# Patient Record
Sex: FEMALE | Race: WHITE | URBAN - METROPOLITAN AREA
[De-identification: names, ages, dates, MRNs, and addresses within clinical notes are randomized per-mention and may not be internally consistent; named-entity substitution may affect disease eponyms.]

---

## 2017-02-13 ENCOUNTER — HISTORICAL (OUTPATIENT)
Dept: LAB | Facility: HOSPITAL | Age: 82
End: 2017-02-13

## 2017-06-14 ENCOUNTER — HISTORICAL (OUTPATIENT)
Dept: LAB | Facility: HOSPITAL | Age: 82
End: 2017-06-14

## 2017-06-14 LAB
ALBUMIN SERPL-MCNC: 4.2 GM/DL (ref 3.4–5)
ALBUMIN/GLOB SERPL: 1.4 RATIO (ref 1.1–2)
ALP SERPL-CCNC: 67 UNIT/L (ref 38–126)
ALT SERPL-CCNC: 27 UNIT/L (ref 12–78)
AST SERPL-CCNC: 14 UNIT/L (ref 15–37)
BILIRUB SERPL-MCNC: 0.5 MG/DL (ref 0.2–1)
BILIRUBIN DIRECT+TOT PNL SERPL-MCNC: 0.1 MG/DL (ref 0–0.5)
BILIRUBIN DIRECT+TOT PNL SERPL-MCNC: 0.4 MG/DL (ref 0–0.8)
BUN SERPL-MCNC: 28 MG/DL (ref 7–18)
CALCIUM SERPL-MCNC: 10.2 MG/DL (ref 8.5–10.1)
CHLORIDE SERPL-SCNC: 101 MMOL/L (ref 98–107)
CO2 SERPL-SCNC: 26 MMOL/L (ref 21–32)
CREAT SERPL-MCNC: 1.19 MG/DL (ref 0.55–1.02)
EST. AVERAGE GLUCOSE BLD GHB EST-MCNC: 212 MG/DL
GLOBULIN SER-MCNC: 2.9 GM/DL (ref 2.4–3.5)
GLUCOSE SERPL-MCNC: 206 MG/DL (ref 74–106)
HBA1C MFR BLD: 9 % (ref 4.2–6.3)
POTASSIUM SERPL-SCNC: 4.3 MMOL/L (ref 3.5–5.1)
PROT SERPL-MCNC: 7.1 GM/DL (ref 6.4–8.2)
SODIUM SERPL-SCNC: 140 MMOL/L (ref 136–145)

## 2017-09-18 ENCOUNTER — HISTORICAL (OUTPATIENT)
Dept: LAB | Facility: HOSPITAL | Age: 82
End: 2017-09-18

## 2017-09-18 LAB
ABS NEUT (OLG): 6.3 X10(3)/MCL (ref 2.1–9.2)
ALBUMIN SERPL-MCNC: 4.3 GM/DL (ref 3.4–5)
ALBUMIN/GLOB SERPL: 1.2 {RATIO}
ALP SERPL-CCNC: 73 UNIT/L (ref 38–126)
ALT SERPL-CCNC: 23 UNIT/L (ref 12–78)
AST SERPL-CCNC: 31 UNIT/L (ref 15–37)
BASOPHILS # BLD AUTO: 0.1 X10(3)/MCL (ref 0–0.2)
BASOPHILS NFR BLD AUTO: 1 %
BILIRUB SERPL-MCNC: 0.7 MG/DL (ref 0.2–1)
BILIRUBIN DIRECT+TOT PNL SERPL-MCNC: 0.1 MG/DL (ref 0–0.5)
BILIRUBIN DIRECT+TOT PNL SERPL-MCNC: 0.6 MG/DL (ref 0–0.8)
BUN SERPL-MCNC: 22 MG/DL (ref 7–18)
CALCIUM SERPL-MCNC: 10.1 MG/DL (ref 8.5–10.1)
CHLORIDE SERPL-SCNC: 101 MMOL/L (ref 98–107)
CHOLEST SERPL-MCNC: 161 MG/DL (ref 0–200)
CHOLEST/HDLC SERPL: 3.3 {RATIO} (ref 0–4)
CO2 SERPL-SCNC: 27 MMOL/L (ref 21–32)
CREAT SERPL-MCNC: 1.08 MG/DL (ref 0.55–1.02)
EOSINOPHIL # BLD AUTO: 0.2 X10(3)/MCL (ref 0–0.9)
EOSINOPHIL NFR BLD AUTO: 2 %
ERYTHROCYTE [DISTWIDTH] IN BLOOD BY AUTOMATED COUNT: 13.2 % (ref 11.5–17)
EST. AVERAGE GLUCOSE BLD GHB EST-MCNC: 146 MG/DL
GLOBULIN SER-MCNC: 3.6 GM/DL (ref 2.4–3.5)
GLUCOSE SERPL-MCNC: 113 MG/DL (ref 74–106)
HBA1C MFR BLD: 6.7 % (ref 4.2–6.3)
HCT VFR BLD AUTO: 42.3 % (ref 37–47)
HDLC SERPL-MCNC: 49 MG/DL (ref 35–60)
HGB BLD-MCNC: 14.4 GM/DL (ref 12–16)
LDLC SERPL CALC-MCNC: 81 MG/DL (ref 0–129)
LYMPHOCYTES # BLD AUTO: 1.9 X10(3)/MCL (ref 0.6–4.6)
LYMPHOCYTES NFR BLD AUTO: 21 %
MCH RBC QN AUTO: 31.5 PG (ref 27–31)
MCHC RBC AUTO-ENTMCNC: 34 GM/DL (ref 33–36)
MCV RBC AUTO: 92.6 FL (ref 80–94)
MONOCYTES # BLD AUTO: 0.6 X10(3)/MCL (ref 0.1–1.3)
MONOCYTES NFR BLD AUTO: 7 %
NEUTROPHILS # BLD AUTO: 6.3 X10(3)/MCL (ref 1.4–7.9)
NEUTROPHILS NFR BLD AUTO: 68 %
PLATELET # BLD AUTO: 289 X10(3)/MCL (ref 130–400)
PMV BLD AUTO: 11 FL (ref 9.4–12.4)
POTASSIUM SERPL-SCNC: 4.4 MMOL/L (ref 3.5–5.1)
PROT SERPL-MCNC: 7.9 GM/DL (ref 6.4–8.2)
RBC # BLD AUTO: 4.57 X10(6)/MCL (ref 4.2–5.4)
SODIUM SERPL-SCNC: 138 MMOL/L (ref 136–145)
TRIGL SERPL-MCNC: 156 MG/DL (ref 30–150)
VLDLC SERPL CALC-MCNC: 31 MG/DL
WBC # SPEC AUTO: 9.2 X10(3)/MCL (ref 4.5–11.5)

## 2017-12-12 ENCOUNTER — HISTORICAL (OUTPATIENT)
Dept: RADIOLOGY | Facility: HOSPITAL | Age: 82
End: 2017-12-12

## 2017-12-12 ENCOUNTER — HISTORICAL (OUTPATIENT)
Dept: LAB | Facility: HOSPITAL | Age: 82
End: 2017-12-12

## 2017-12-12 LAB
ALBUMIN SERPL-MCNC: 4.2 GM/DL (ref 3.4–5)
ALBUMIN/GLOB SERPL: 1.2 RATIO (ref 1.1–2)
ALP SERPL-CCNC: 77 UNIT/L (ref 38–126)
ALT SERPL-CCNC: 29 UNIT/L (ref 12–78)
AST SERPL-CCNC: 15 UNIT/L (ref 15–37)
BILIRUB SERPL-MCNC: 0.6 MG/DL (ref 0.2–1)
BILIRUBIN DIRECT+TOT PNL SERPL-MCNC: 0.1 MG/DL (ref 0–0.5)
BILIRUBIN DIRECT+TOT PNL SERPL-MCNC: 0.5 MG/DL (ref 0–0.8)
BUN SERPL-MCNC: 19 MG/DL (ref 7–18)
CALCIUM SERPL-MCNC: 9.3 MG/DL (ref 8.5–10.1)
CHLORIDE SERPL-SCNC: 100 MMOL/L (ref 98–107)
CO2 SERPL-SCNC: 29 MMOL/L (ref 21–32)
CREAT SERPL-MCNC: 1.08 MG/DL (ref 0.55–1.02)
CREAT UR-MCNC: 40.5 MG/DL
EST. AVERAGE GLUCOSE BLD GHB EST-MCNC: 143 MG/DL
GLOBULIN SER-MCNC: 3.4 GM/DL (ref 2.4–3.5)
GLUCOSE SERPL-MCNC: 110 MG/DL (ref 74–106)
HBA1C MFR BLD: 6.6 % (ref 4.2–6.3)
MICROALBUMIN UR-MCNC: 3.4 MG/DL
MICROALBUMIN/CREAT RATIO PNL UR: 84.7 MG/GM CR (ref 0–30)
POTASSIUM SERPL-SCNC: 3.8 MMOL/L (ref 3.5–5.1)
PROT SERPL-MCNC: 7.6 GM/DL (ref 6.4–8.2)
SODIUM SERPL-SCNC: 136 MMOL/L (ref 136–145)

## 2018-03-13 ENCOUNTER — HISTORICAL (OUTPATIENT)
Dept: LAB | Facility: HOSPITAL | Age: 83
End: 2018-03-13

## 2018-03-13 LAB
ABS NEUT (OLG): 6.69 X10(3)/MCL (ref 2.1–9.2)
ALBUMIN SERPL-MCNC: 4.2 GM/DL (ref 3.4–5)
ALBUMIN/GLOB SERPL: 1.4 {RATIO}
ALP SERPL-CCNC: 60 UNIT/L (ref 38–126)
ALT SERPL-CCNC: 22 UNIT/L (ref 12–78)
AST SERPL-CCNC: 14 UNIT/L (ref 15–37)
BASOPHILS # BLD AUTO: 0.1 X10(3)/MCL (ref 0–0.2)
BASOPHILS NFR BLD AUTO: 1 %
BILIRUB SERPL-MCNC: 0.6 MG/DL (ref 0.2–1)
BILIRUB SERPL-MCNC: NEGATIVE MG/DL
BILIRUBIN DIRECT+TOT PNL SERPL-MCNC: 0.1 MG/DL (ref 0–0.2)
BILIRUBIN DIRECT+TOT PNL SERPL-MCNC: 0.5 MG/DL (ref 0–0.8)
BLOOD URINE, POC: NORMAL
BUN SERPL-MCNC: 20 MG/DL (ref 7–18)
CALCIUM SERPL-MCNC: 9.9 MG/DL (ref 8.5–10.1)
CHLORIDE SERPL-SCNC: 102 MMOL/L (ref 98–107)
CHOLEST SERPL-MCNC: 152 MG/DL (ref 0–200)
CHOLEST/HDLC SERPL: 3.4 {RATIO} (ref 0–4)
CLARITY, POC UA: CLEAR
CO2 SERPL-SCNC: 30 MMOL/L (ref 21–32)
COLOR, POC UA: NORMAL
CREAT SERPL-MCNC: 1.13 MG/DL (ref 0.55–1.02)
CREAT UR-MCNC: 73.1 MG/DL
EOSINOPHIL # BLD AUTO: 0.2 X10(3)/MCL (ref 0–0.9)
EOSINOPHIL NFR BLD AUTO: 3 %
ERYTHROCYTE [DISTWIDTH] IN BLOOD BY AUTOMATED COUNT: 13 % (ref 11.5–17)
EST. AVERAGE GLUCOSE BLD GHB EST-MCNC: 151 MG/DL
GLOBULIN SER-MCNC: 3 GM/DL (ref 2.4–3.5)
GLUCOSE SERPL-MCNC: 123 MG/DL (ref 74–106)
GLUCOSE UR QL STRIP: NEGATIVE
HBA1C MFR BLD: 6.9 % (ref 4.2–6.3)
HCT VFR BLD AUTO: 41.1 % (ref 37–47)
HDLC SERPL-MCNC: 45 MG/DL (ref 35–60)
HGB BLD-MCNC: 13.8 GM/DL (ref 12–16)
KETONES UR QL STRIP: NEGATIVE
LDLC SERPL CALC-MCNC: 75 MG/DL (ref 0–129)
LEUKOCYTE EST, POC UA: NORMAL
LYMPHOCYTES # BLD AUTO: 1.7 X10(3)/MCL (ref 0.6–4.6)
LYMPHOCYTES NFR BLD AUTO: 18 %
MCH RBC QN AUTO: 30.8 PG (ref 27–31)
MCHC RBC AUTO-ENTMCNC: 33.6 GM/DL (ref 33–36)
MCV RBC AUTO: 91.7 FL (ref 80–94)
MICROALBUMIN UR-MCNC: 30.9 MG/DL
MICROALBUMIN/CREAT RATIO PNL UR: 422.7 MG/GM CR (ref 0–30)
MONOCYTES # BLD AUTO: 0.7 X10(3)/MCL (ref 0.1–1.3)
MONOCYTES NFR BLD AUTO: 7 %
NEUTROPHILS # BLD AUTO: 6.69 X10(3)/MCL (ref 2.1–9.2)
NEUTROPHILS NFR BLD AUTO: 71 %
NITRITE, POC UA: NEGATIVE
PH, POC UA: 6.5
PLATELET # BLD AUTO: 268 X10(3)/MCL (ref 130–400)
PMV BLD AUTO: 10.8 FL (ref 9.4–12.4)
POTASSIUM SERPL-SCNC: 4 MMOL/L (ref 3.5–5.1)
PROT SERPL-MCNC: 7.2 GM/DL (ref 6.4–8.2)
PROTEIN, POC: NORMAL
RBC # BLD AUTO: 4.48 X10(6)/MCL (ref 4.2–5.4)
SODIUM SERPL-SCNC: 138 MMOL/L (ref 136–145)
SPECIFIC GRAVITY, POC UA: 1.01
TRIGL SERPL-MCNC: 161 MG/DL (ref 30–150)
TSH SERPL-ACNC: 1.44 MIU/ML (ref 0.36–3.74)
UROBILINOGEN, POC UA: NORMAL
VLDLC SERPL CALC-MCNC: 32 MG/DL
WBC # SPEC AUTO: 9.4 X10(3)/MCL (ref 4.5–11.5)

## 2018-03-15 ENCOUNTER — HOSPITAL ENCOUNTER (OUTPATIENT)
Dept: ADMINISTRATIVE | Facility: HOSPITAL | Age: 83
End: 2018-03-20
Attending: INTERNAL MEDICINE | Admitting: INTERNAL MEDICINE

## 2018-03-15 LAB
ABS NEUT (OLG): 12.12 X10(3)/MCL (ref 2.1–9.2)
ALBUMIN SERPL-MCNC: 3.6 GM/DL (ref 3.4–5)
ALBUMIN/GLOB SERPL: 1 {RATIO}
ALP SERPL-CCNC: 65 UNIT/L (ref 45–117)
ALT SERPL-CCNC: 42 UNIT/L (ref 13–56)
AST SERPL-CCNC: 56 UNIT/L (ref 15–37)
BASOPHILS # BLD AUTO: 0.01 X10(3)/MCL (ref 0–0.2)
BASOPHILS NFR BLD AUTO: 0.1 % (ref 0–1)
BILIRUB SERPL-MCNC: 0.5 MG/DL (ref 0.2–1)
BILIRUBIN DIRECT+TOT PNL SERPL-MCNC: 0.1 MG/DL (ref 0–0.2)
BILIRUBIN DIRECT+TOT PNL SERPL-MCNC: 0.4 MG/DL (ref 0–1)
BUN SERPL-MCNC: 23 MG/DL (ref 7–18)
CALCIUM SERPL-MCNC: 8.8 MG/DL (ref 8.5–10.1)
CHLORIDE SERPL-SCNC: 103 MMOL/L (ref 98–107)
CO2 SERPL-SCNC: 25 MMOL/L (ref 21–32)
CREAT SERPL-MCNC: 1.25 MG/DL (ref 0.55–1.02)
EOSINOPHIL # BLD AUTO: 0.13 X10(3)/MCL (ref 0–0.9)
EOSINOPHIL NFR BLD AUTO: 1 % (ref 0–6.4)
ERYTHROCYTE [DISTWIDTH] IN BLOOD BY AUTOMATED COUNT: 12.8 % (ref 11.5–17)
GLOBULIN SER-MCNC: 3 GM/DL (ref 2–4)
GLUCOSE SERPL-MCNC: 219 MG/DL (ref 74–106)
HCT VFR BLD AUTO: 37.4 % (ref 37–47)
HGB BLD-MCNC: 13.1 GM/DL (ref 12–16)
IMM GRANULOCYTES # BLD AUTO: 0.09 10*3/UL (ref 0–0.02)
IMM GRANULOCYTES NFR BLD AUTO: 0.7 % (ref 0–0.43)
LYMPHOCYTES # BLD AUTO: 0.16 X10(3)/MCL (ref 0.6–4.6)
LYMPHOCYTES NFR BLD AUTO: 1.2 % (ref 16–44)
MCH RBC QN AUTO: 31.6 PG (ref 27–31)
MCHC RBC AUTO-ENTMCNC: 35 GM/DL (ref 33–36)
MCV RBC AUTO: 90.3 FL (ref 80–94)
MONOCYTES # BLD AUTO: 0.52 X10(3)/MCL (ref 0.1–1.3)
MONOCYTES NFR BLD AUTO: 4 % (ref 4–12.1)
NEUTROPHILS # BLD AUTO: 12.12 X10(3)/MCL (ref 2.1–9.2)
NEUTROPHILS NFR BLD AUTO: 93 % (ref 43–73)
NRBC BLD AUTO-RTO: 0 % (ref 0–0.2)
PLATELET # BLD AUTO: 174 X10(3)/MCL (ref 130–400)
PMV BLD AUTO: 10.3 FL (ref 7.4–10.4)
POTASSIUM SERPL-SCNC: 3.6 MMOL/L (ref 3.5–5.1)
PROT SERPL-MCNC: 7 GM/DL (ref 6.4–8.2)
RBC # BLD AUTO: 4.14 X10(6)/MCL (ref 4.2–5.4)
SODIUM SERPL-SCNC: 138 MMOL/L (ref 136–145)
WBC # SPEC AUTO: 13 X10(3)/MCL (ref 4.5–11.5)

## 2018-03-16 LAB
ABS NEUT (OLG): 5.47 X10(3)/MCL (ref 2.1–9.2)
ALBUMIN SERPL-MCNC: 2.8 GM/DL (ref 3.4–5)
ALBUMIN/GLOB SERPL: 1 {RATIO}
ALP SERPL-CCNC: 71 UNIT/L (ref 45–117)
ALT SERPL-CCNC: 234 UNIT/L (ref 13–56)
AST SERPL-CCNC: 236 UNIT/L (ref 15–37)
BASOPHILS # BLD AUTO: 0.01 X10(3)/MCL (ref 0–0.2)
BASOPHILS NFR BLD AUTO: 0.2 % (ref 0–1)
BILIRUB SERPL-MCNC: 0.4 MG/DL (ref 0.2–1)
BILIRUBIN DIRECT+TOT PNL SERPL-MCNC: 0.2 MG/DL (ref 0–0.2)
BILIRUBIN DIRECT+TOT PNL SERPL-MCNC: 0.2 MG/DL (ref 0–1)
BUN SERPL-MCNC: 27 MG/DL (ref 7–18)
CALCIUM SERPL-MCNC: 7.9 MG/DL (ref 8.5–10.1)
CHLORIDE SERPL-SCNC: 105 MMOL/L (ref 98–107)
CO2 SERPL-SCNC: 26 MMOL/L (ref 21–32)
CREAT SERPL-MCNC: 1.25 MG/DL (ref 0.55–1.02)
EOSINOPHIL # BLD AUTO: 0.1 X10(3)/MCL (ref 0–0.9)
EOSINOPHIL NFR BLD AUTO: 1.6 % (ref 0–6.4)
ERYTHROCYTE [DISTWIDTH] IN BLOOD BY AUTOMATED COUNT: 13 % (ref 11.5–17)
EST. AVERAGE GLUCOSE BLD GHB EST-MCNC: 151 MG/DL
GLOBULIN SER-MCNC: 3 GM/DL (ref 2–4)
GLUCOSE SERPL-MCNC: 170 MG/DL (ref 74–106)
HAV IGM SERPL QL IA: NORMAL
HBA1C MFR BLD: 6.9 % (ref 4.2–6.3)
HBV CORE IGM SERPL QL IA: NORMAL
HBV SURFACE AG SERPL QL IA: NORMAL
HCT VFR BLD AUTO: 33.6 % (ref 37–47)
HCV AB SERPL QL IA: NORMAL
HGB BLD-MCNC: 11.7 GM/DL (ref 12–16)
IMM GRANULOCYTES # BLD AUTO: 0.02 10*3/UL (ref 0–0.02)
IMM GRANULOCYTES NFR BLD AUTO: 0.3 % (ref 0–0.43)
LYMPHOCYTES # BLD AUTO: 0.22 X10(3)/MCL (ref 0.6–4.6)
LYMPHOCYTES NFR BLD AUTO: 3.6 % (ref 16–44)
MCH RBC QN AUTO: 31.5 PG (ref 27–31)
MCHC RBC AUTO-ENTMCNC: 34.8 GM/DL (ref 33–36)
MCV RBC AUTO: 90.3 FL (ref 80–94)
MONOCYTES # BLD AUTO: 0.31 X10(3)/MCL (ref 0.1–1.3)
MONOCYTES NFR BLD AUTO: 5.1 % (ref 4–12.1)
NEUTROPHILS # BLD AUTO: 5.47 X10(3)/MCL (ref 2.1–9.2)
NEUTROPHILS NFR BLD AUTO: 89.2 % (ref 43–73)
NRBC BLD AUTO-RTO: 0 % (ref 0–0.2)
PLATELET # BLD AUTO: 165 X10(3)/MCL (ref 130–400)
PMV BLD AUTO: 10.4 FL (ref 7.4–10.4)
POTASSIUM SERPL-SCNC: 3 MMOL/L (ref 3.5–5.1)
PROT SERPL-MCNC: 5.9 GM/DL (ref 6.4–8.2)
RBC # BLD AUTO: 3.72 X10(6)/MCL (ref 4.2–5.4)
SODIUM SERPL-SCNC: 140 MMOL/L (ref 136–145)
WBC # SPEC AUTO: 6.1 X10(3)/MCL (ref 4.5–11.5)

## 2018-03-17 LAB
ABS NEUT (OLG): 8.35 X10(3)/MCL (ref 2.1–9.2)
ALBUMIN SERPL-MCNC: 2.7 GM/DL (ref 3.4–5)
ALBUMIN/GLOB SERPL: 1 {RATIO}
ALP SERPL-CCNC: 119 UNIT/L (ref 45–117)
ALT SERPL-CCNC: 202 UNIT/L (ref 13–56)
AST SERPL-CCNC: 120 UNIT/L (ref 15–37)
BILIRUB SERPL-MCNC: 0.6 MG/DL (ref 0.2–1)
BILIRUBIN DIRECT+TOT PNL SERPL-MCNC: 0.2 MG/DL (ref 0–0.2)
BILIRUBIN DIRECT+TOT PNL SERPL-MCNC: 0.4 MG/DL (ref 0–1)
BUN SERPL-MCNC: 23 MG/DL (ref 7–18)
CALCIUM SERPL-MCNC: 8.1 MG/DL (ref 8.5–10.1)
CHLORIDE SERPL-SCNC: 105 MMOL/L (ref 98–107)
CO2 SERPL-SCNC: 22 MMOL/L (ref 21–32)
CREAT SERPL-MCNC: 0.92 MG/DL (ref 0.55–1.02)
CRP SERPL HS-MCNC: 120 MG/L
ERYTHROCYTE [DISTWIDTH] IN BLOOD BY AUTOMATED COUNT: 13.1 % (ref 11.5–17)
ERYTHROCYTE [SEDIMENTATION RATE] IN BLOOD: 23 MM/HR (ref 0–30)
GLOBULIN SER-MCNC: 3 GM/DL (ref 2–4)
GLUCOSE SERPL-MCNC: 139 MG/DL (ref 74–106)
HCT VFR BLD AUTO: 32.4 % (ref 37–47)
HGB BLD-MCNC: 11.3 GM/DL (ref 12–16)
MCH RBC QN AUTO: 31.2 PG (ref 27–31)
MCHC RBC AUTO-ENTMCNC: 34.9 GM/DL (ref 33–36)
MCV RBC AUTO: 89.5 FL (ref 80–94)
PLATELET # BLD AUTO: 142 X10(3)/MCL (ref 130–400)
PMV BLD AUTO: 10.6 FL (ref 7.4–10.4)
POTASSIUM SERPL-SCNC: 3.1 MMOL/L (ref 3.5–5.1)
PROT SERPL-MCNC: 5.8 GM/DL (ref 6.4–8.2)
RBC # BLD AUTO: 3.62 X10(6)/MCL (ref 4.2–5.4)
SODIUM SERPL-SCNC: 138 MMOL/L (ref 136–145)
WBC # SPEC AUTO: 10 X10(3)/MCL (ref 4.5–11.5)

## 2018-03-18 LAB
ABS NEUT (OLG): 8.68 X10(3)/MCL (ref 2.1–9.2)
ALBUMIN SERPL-MCNC: 2.8 GM/DL (ref 3.4–5)
ALBUMIN/GLOB SERPL: 1 {RATIO}
ALP SERPL-CCNC: 137 UNIT/L (ref 45–117)
ALT SERPL-CCNC: 149 UNIT/L (ref 13–56)
APPEARANCE, UA: ABNORMAL
AST SERPL-CCNC: 55 UNIT/L (ref 15–37)
BACTERIA SPEC CULT: ABNORMAL /HPF
BILIRUB SERPL-MCNC: 0.7 MG/DL (ref 0.2–1)
BILIRUB UR QL STRIP: NEGATIVE
BILIRUBIN DIRECT+TOT PNL SERPL-MCNC: 0.2 MG/DL (ref 0–0.2)
BILIRUBIN DIRECT+TOT PNL SERPL-MCNC: 0.5 MG/DL (ref 0–1)
BUN SERPL-MCNC: 20 MG/DL (ref 7–18)
CALCIUM SERPL-MCNC: 8.5 MG/DL (ref 8.5–10.1)
CHLORIDE SERPL-SCNC: 103 MMOL/L (ref 98–107)
CO2 SERPL-SCNC: 23 MMOL/L (ref 21–32)
COLOR UR: YELLOW
CREAT SERPL-MCNC: 0.86 MG/DL (ref 0.55–1.02)
ERYTHROCYTE [DISTWIDTH] IN BLOOD BY AUTOMATED COUNT: 13 % (ref 11.5–17)
GLOBULIN SER-MCNC: 3 GM/DL (ref 2–4)
GLUCOSE (UA): NEGATIVE
GLUCOSE SERPL-MCNC: 143 MG/DL (ref 74–106)
HCT VFR BLD AUTO: 31.2 % (ref 37–47)
HGB BLD-MCNC: 11 GM/DL (ref 12–16)
HGB UR QL STRIP: ABNORMAL
KETONES UR QL STRIP: ABNORMAL
LEUKOCYTE ESTERASE UR QL STRIP: NEGATIVE
MCH RBC QN AUTO: 31.1 PG (ref 27–31)
MCHC RBC AUTO-ENTMCNC: 35.3 GM/DL (ref 33–36)
MCV RBC AUTO: 88.1 FL (ref 80–94)
NITRITE UR QL STRIP: NEGATIVE
PH UR STRIP: 5.5 [PH] (ref 5–9)
PLATELET # BLD AUTO: 165 X10(3)/MCL (ref 130–400)
PMV BLD AUTO: 10.6 FL (ref 7.4–10.4)
POTASSIUM SERPL-SCNC: 3.2 MMOL/L (ref 3.5–5.1)
PROT SERPL-MCNC: 6.1 GM/DL (ref 6.4–8.2)
PROT UR QL STRIP: ABNORMAL
RBC # BLD AUTO: 3.54 X10(6)/MCL (ref 4.2–5.4)
RBC #/AREA URNS HPF: 77 /HPF (ref 0–2)
SODIUM SERPL-SCNC: 137 MMOL/L (ref 136–145)
SP GR UR STRIP: 1.02 (ref 1–1.03)
SQUAMOUS EPITHELIAL, UA: ABNORMAL
UROBILINOGEN UR STRIP-ACNC: 0.2
WBC # SPEC AUTO: 10.3 X10(3)/MCL (ref 4.5–11.5)
WBC #/AREA URNS HPF: ABNORMAL /[HPF]

## 2018-03-19 LAB
ALBUMIN SERPL-MCNC: 2.7 GM/DL (ref 3.4–5)
ALBUMIN/GLOB SERPL: 1 {RATIO}
ALP SERPL-CCNC: 124 UNIT/L (ref 45–117)
ALT SERPL-CCNC: 106 UNIT/L (ref 13–56)
AST SERPL-CCNC: 27 UNIT/L (ref 15–37)
BILIRUB SERPL-MCNC: 0.7 MG/DL (ref 0.2–1)
BILIRUBIN DIRECT+TOT PNL SERPL-MCNC: 0.2 MG/DL (ref 0–0.2)
BILIRUBIN DIRECT+TOT PNL SERPL-MCNC: 0.5 MG/DL (ref 0–1)
BUN SERPL-MCNC: 19 MG/DL (ref 7–18)
CALCIUM SERPL-MCNC: 8.5 MG/DL (ref 8.5–10.1)
CHLORIDE SERPL-SCNC: 100 MMOL/L (ref 98–107)
CO2 SERPL-SCNC: 27 MMOL/L (ref 21–32)
CREAT SERPL-MCNC: 0.92 MG/DL (ref 0.55–1.02)
GLOBULIN SER-MCNC: 3 GM/DL (ref 2–4)
GLUCOSE SERPL-MCNC: 108 MG/DL (ref 74–106)
LIPASE SERPL-CCNC: 55 UNIT/L (ref 73–393)
POTASSIUM SERPL-SCNC: 3.1 MMOL/L (ref 3.5–5.1)
PROT SERPL-MCNC: 6.1 GM/DL (ref 6.4–8.2)
SODIUM SERPL-SCNC: 134 MMOL/L (ref 136–145)

## 2018-03-20 LAB
ABS NEUT (OLG): 4.86 X10(3)/MCL (ref 2.1–9.2)
ALBUMIN SERPL-MCNC: 2.5 GM/DL (ref 3.4–5)
ALBUMIN/GLOB SERPL: 1 {RATIO}
ALP SERPL-CCNC: 109 UNIT/L (ref 45–117)
ALT SERPL-CCNC: 72 UNIT/L (ref 13–56)
AST SERPL-CCNC: 15 UNIT/L (ref 15–37)
BILIRUB SERPL-MCNC: 0.5 MG/DL (ref 0.2–1)
BILIRUBIN DIRECT+TOT PNL SERPL-MCNC: 0.1 MG/DL (ref 0–0.2)
BILIRUBIN DIRECT+TOT PNL SERPL-MCNC: 0.4 MG/DL (ref 0–1)
BUN SERPL-MCNC: 14 MG/DL (ref 7–18)
CALCIUM SERPL-MCNC: 8.4 MG/DL (ref 8.5–10.1)
CHLORIDE SERPL-SCNC: 101 MMOL/L (ref 98–107)
CO2 SERPL-SCNC: 28 MMOL/L (ref 21–32)
CREAT SERPL-MCNC: 0.75 MG/DL (ref 0.55–1.02)
ERYTHROCYTE [DISTWIDTH] IN BLOOD BY AUTOMATED COUNT: 12.8 % (ref 11.5–17)
GLOBULIN SER-MCNC: 3 GM/DL (ref 2–4)
GLUCOSE SERPL-MCNC: 104 MG/DL (ref 74–106)
HCT VFR BLD AUTO: 30 % (ref 37–47)
HGB BLD-MCNC: 10.4 GM/DL (ref 12–16)
MAGNESIUM SERPL-MCNC: 1.9 MG/DL (ref 1.8–2.4)
MCH RBC QN AUTO: 30.9 PG (ref 27–31)
MCHC RBC AUTO-ENTMCNC: 34.7 GM/DL (ref 33–36)
MCV RBC AUTO: 89 FL (ref 80–94)
PLATELET # BLD AUTO: 190 X10(3)/MCL (ref 130–400)
PMV BLD AUTO: 10.4 FL (ref 7.4–10.4)
POTASSIUM SERPL-SCNC: 3.4 MMOL/L (ref 3.5–5.1)
PROT SERPL-MCNC: 5.7 GM/DL (ref 6.4–8.2)
RBC # BLD AUTO: 3.37 X10(6)/MCL (ref 4.2–5.4)
SODIUM SERPL-SCNC: 137 MMOL/L (ref 136–145)
WBC # SPEC AUTO: 7.8 X10(3)/MCL (ref 4.5–11.5)

## 2018-03-21 LAB
FINAL CULTURE: NORMAL
FINAL CULTURE: NORMAL

## 2018-03-28 ENCOUNTER — HISTORICAL (OUTPATIENT)
Dept: LAB | Facility: HOSPITAL | Age: 83
End: 2018-03-28

## 2018-03-28 LAB
ALBUMIN SERPL-MCNC: 3.8 GM/DL (ref 3.4–5)
ALBUMIN/GLOB SERPL: 1.2 RATIO (ref 1.1–2)
ALP SERPL-CCNC: 95 UNIT/L (ref 38–126)
ALT SERPL-CCNC: 31 UNIT/L (ref 12–78)
AST SERPL-CCNC: 16 UNIT/L (ref 15–37)
BILIRUB SERPL-MCNC: 0.5 MG/DL (ref 0.2–1)
BILIRUBIN DIRECT+TOT PNL SERPL-MCNC: 0.2 MG/DL (ref 0–0.5)
BILIRUBIN DIRECT+TOT PNL SERPL-MCNC: 0.3 MG/DL (ref 0–0.8)
BUN SERPL-MCNC: 21 MG/DL (ref 7–18)
CALCIUM SERPL-MCNC: 9.9 MG/DL (ref 8.5–10.1)
CHLORIDE SERPL-SCNC: 100 MMOL/L (ref 98–107)
CO2 SERPL-SCNC: 28 MMOL/L (ref 21–32)
CREAT SERPL-MCNC: 1.04 MG/DL (ref 0.55–1.02)
GLOBULIN SER-MCNC: 3.2 GM/DL (ref 2.4–3.5)
GLUCOSE SERPL-MCNC: 83 MG/DL (ref 74–106)
POTASSIUM SERPL-SCNC: 4.3 MMOL/L (ref 3.5–5.1)
PROT SERPL-MCNC: 7 GM/DL (ref 6.4–8.2)
SODIUM SERPL-SCNC: 137 MMOL/L (ref 136–145)

## 2018-04-17 ENCOUNTER — HISTORICAL (OUTPATIENT)
Dept: URGENT CARE | Facility: CLINIC | Age: 83
End: 2018-04-17

## 2018-04-17 LAB
BILIRUB SERPL-MCNC: NEGATIVE MG/DL
BLOOD URINE, POC: NEGATIVE
CLARITY, POC UA: NORMAL
COLOR, POC UA: YELLOW
GLUCOSE UR QL STRIP: NEGATIVE
KETONES UR QL STRIP: NEGATIVE
LEUKOCYTE EST, POC UA: NORMAL
NITRITE, POC UA: NEGATIVE
PH, POC UA: 6
PROTEIN, POC: NEGATIVE
SPECIFIC GRAVITY, POC UA: 1.01
UROBILINOGEN, POC UA: NORMAL

## 2018-05-03 ENCOUNTER — HISTORICAL (OUTPATIENT)
Dept: RADIOLOGY | Facility: HOSPITAL | Age: 83
End: 2018-05-03

## 2018-10-01 ENCOUNTER — HISTORICAL (OUTPATIENT)
Dept: LAB | Facility: HOSPITAL | Age: 83
End: 2018-10-01

## 2018-10-01 LAB
ALBUMIN SERPL-MCNC: 4.3 GM/DL (ref 3.4–5)
ALBUMIN/GLOB SERPL: 1.3 {RATIO}
ALP SERPL-CCNC: 73 UNIT/L (ref 38–126)
ALT SERPL-CCNC: 23 UNIT/L (ref 12–78)
AST SERPL-CCNC: 16 UNIT/L (ref 15–37)
BILIRUB SERPL-MCNC: 0.7 MG/DL (ref 0.2–1)
BILIRUBIN DIRECT+TOT PNL SERPL-MCNC: 0.1 MG/DL (ref 0–0.2)
BILIRUBIN DIRECT+TOT PNL SERPL-MCNC: 0.6 MG/DL (ref 0–0.8)
BUN SERPL-MCNC: 25 MG/DL (ref 7–18)
CALCIUM SERPL-MCNC: 10.3 MG/DL (ref 8.5–10.1)
CHLORIDE SERPL-SCNC: 104 MMOL/L (ref 98–107)
CHOLEST SERPL-MCNC: 176 MG/DL (ref 0–200)
CHOLEST/HDLC SERPL: 3.8 {RATIO} (ref 0–4)
CO2 SERPL-SCNC: 30 MMOL/L (ref 21–32)
CREAT SERPL-MCNC: 1.23 MG/DL (ref 0.55–1.02)
EST. AVERAGE GLUCOSE BLD GHB EST-MCNC: 128 MG/DL
GLOBULIN SER-MCNC: 3.3 GM/DL (ref 2.4–3.5)
GLUCOSE SERPL-MCNC: 216 MG/DL (ref 74–106)
HBA1C MFR BLD: 6.1 % (ref 4.2–6.3)
HDLC SERPL-MCNC: 46 MG/DL (ref 35–60)
LDLC SERPL CALC-MCNC: 91 MG/DL (ref 0–129)
POTASSIUM SERPL-SCNC: 5 MMOL/L (ref 3.5–5.1)
PROT SERPL-MCNC: 7.6 GM/DL (ref 6.4–8.2)
SODIUM SERPL-SCNC: 143 MMOL/L (ref 136–145)
TRIGL SERPL-MCNC: 193 MG/DL (ref 30–150)
VLDLC SERPL CALC-MCNC: 39 MG/DL

## 2018-11-09 ENCOUNTER — HISTORICAL (OUTPATIENT)
Dept: LAB | Facility: HOSPITAL | Age: 83
End: 2018-11-09

## 2018-11-09 LAB
ALBUMIN SERPL-MCNC: 4 GM/DL (ref 3.4–5)
ALBUMIN/GLOB SERPL: 1.4 {RATIO}
ALP SERPL-CCNC: 60 UNIT/L (ref 38–126)
ALT SERPL-CCNC: 25 UNIT/L (ref 12–78)
AST SERPL-CCNC: 17 UNIT/L (ref 15–37)
BILIRUB SERPL-MCNC: 0.5 MG/DL (ref 0.2–1)
BILIRUBIN DIRECT+TOT PNL SERPL-MCNC: 0.1 MG/DL (ref 0–0.2)
BILIRUBIN DIRECT+TOT PNL SERPL-MCNC: 0.4 MG/DL (ref 0–0.8)
BUN SERPL-MCNC: 23 MG/DL (ref 7–18)
CALCIUM SERPL-MCNC: 9.5 MG/DL (ref 8.5–10.1)
CHLORIDE SERPL-SCNC: 105 MMOL/L (ref 98–107)
CHOLEST SERPL-MCNC: 142 MG/DL (ref 0–200)
CHOLEST/HDLC SERPL: 3.2 {RATIO} (ref 0–4)
CO2 SERPL-SCNC: 29 MMOL/L (ref 21–32)
CREAT SERPL-MCNC: 1.14 MG/DL (ref 0.55–1.02)
GLOBULIN SER-MCNC: 2.9 GM/DL (ref 2.4–3.5)
GLUCOSE SERPL-MCNC: 132 MG/DL (ref 74–106)
HDLC SERPL-MCNC: 44 MG/DL (ref 35–60)
LDLC SERPL CALC-MCNC: 76 MG/DL (ref 0–129)
POTASSIUM SERPL-SCNC: 3.9 MMOL/L (ref 3.5–5.1)
PROT SERPL-MCNC: 6.9 GM/DL (ref 6.4–8.2)
SODIUM SERPL-SCNC: 144 MMOL/L (ref 136–145)
TRIGL SERPL-MCNC: 108 MG/DL (ref 30–150)
VLDLC SERPL CALC-MCNC: 22 MG/DL

## 2019-02-12 ENCOUNTER — HISTORICAL (OUTPATIENT)
Dept: URGENT CARE | Facility: CLINIC | Age: 84
End: 2019-02-12

## 2019-02-12 LAB
BILIRUB SERPL-MCNC: NEGATIVE MG/DL
BLOOD URINE, POC: NEGATIVE
CLARITY, POC UA: NORMAL
COLOR, POC UA: YELLOW
GLUCOSE UR QL STRIP: NEGATIVE
KETONES UR QL STRIP: NEGATIVE
LEUKOCYTE EST, POC UA: NORMAL
NITRITE, POC UA: NEGATIVE
PH, POC UA: 6
PROTEIN, POC: NEGATIVE
SPECIFIC GRAVITY, POC UA: 1
UROBILINOGEN, POC UA: NORMAL

## 2019-06-07 ENCOUNTER — HISTORICAL (OUTPATIENT)
Dept: LAB | Facility: HOSPITAL | Age: 84
End: 2019-06-07

## 2019-06-07 LAB
APPEARANCE, UA: CLEAR
BACTERIA SPEC CULT: ABNORMAL /HPF
BILIRUB UR QL STRIP: NEGATIVE
COLOR UR: YELLOW
GLUCOSE (UA): NEGATIVE
HGB UR QL STRIP: NEGATIVE
KETONES UR QL STRIP: NEGATIVE
LEUKOCYTE ESTERASE UR QL STRIP: ABNORMAL
NITRITE UR QL STRIP: NEGATIVE
PH UR STRIP: 5.5 [PH] (ref 5–9)
PROT UR QL STRIP: NEGATIVE
RBC #/AREA URNS HPF: ABNORMAL /[HPF]
SP GR UR STRIP: 1.01 (ref 1–1.03)
SQUAMOUS EPITHELIAL, UA: ABNORMAL
UROBILINOGEN UR STRIP-ACNC: 1
WBC #/AREA URNS HPF: 53 /HPF (ref 0–3)

## 2019-06-09 LAB — FINAL CULTURE: NO GROWTH

## 2020-02-17 ENCOUNTER — HISTORICAL (OUTPATIENT)
Dept: RADIOLOGY | Facility: HOSPITAL | Age: 85
End: 2020-02-17

## 2020-05-08 ENCOUNTER — HISTORICAL (OUTPATIENT)
Dept: RADIOLOGY | Facility: HOSPITAL | Age: 85
End: 2020-05-08

## 2020-08-03 ENCOUNTER — HISTORICAL (OUTPATIENT)
Dept: RADIOLOGY | Facility: HOSPITAL | Age: 85
End: 2020-08-03

## 2020-11-23 ENCOUNTER — HISTORICAL (OUTPATIENT)
Dept: RADIOLOGY | Facility: HOSPITAL | Age: 85
End: 2020-11-23

## 2022-04-10 ENCOUNTER — HISTORICAL (OUTPATIENT)
Dept: ADMINISTRATIVE | Facility: HOSPITAL | Age: 87
End: 2022-04-10
Payer: MEDICARE

## 2022-04-30 VITALS
HEIGHT: 62 IN | WEIGHT: 163.13 LBS | OXYGEN SATURATION: 99 % | SYSTOLIC BLOOD PRESSURE: 130 MMHG | BODY MASS INDEX: 30.02 KG/M2 | DIASTOLIC BLOOD PRESSURE: 86 MMHG

## 2022-04-30 NOTE — H&P
Patient:   Carlito Winslow            MRN: [id number]            FIN: [id number]               Age:   82 years     Sex:  Female     :  1935   Associated Diagnoses:   None   Author:   Elver Boyer MD      Basic Information   Source of history:  Self.    Referral source:  Emergency department.    History limitation:  None.    PCP: Dr. Guzman      Chief Complaint   3/15/2018 13:15 CDT      c/o shaking, recently dx with UTI by PCP yesterday     +gen weakness.      History of Present Illness   This is a 82 year old with a history of DM2, atrial fibrillation, and hypertension. She was started on macrobid by her pcp yesterday after she was diagnosed with a UTI. She has taken 3 doses. Overnight she started having shaking spells, nausea and vomiting. She became extremely weak and called EMS to bring her to the hospital. She denies diarrhea. She also had mild left flank pain that has resolved. She c/o malaise and myalgias. She has no other complaints at present. She denies sick contacts.         Review of Systems   Except as documented, all other systems reviewed and negative.      Health Status   Allergies:    Allergic Reactions (Selected)  Severity Not Documented  Cipro- Hives.  Demerol HCl- Headache.  FentaNYL- Cant breathe.  Hydrochlorothiazide- Unknown.  Penicillins- Rash/hllucinations.,    Allergies (5) Active Reaction  Cipro hives  Demerol HCl headache  fentaNYL cant breathe  hydrochlorothiazide unknown  penicillins rash/hllucinations     Current medications:  (Selected)   Inpatient Medications  Ordered  Rocephin: 2 gm, form: Injection, IV Piggyback, q24hr, Infuse over: 1 hr, first dose 03/15/18 14:00:00 CDT  Prescriptions  Prescribed  ACCU-CHEK JONI PLUS   Strip: See Instructions, TEST THREE TIMES DAILY, # 300 unknown unit, 2 Refill(s), eRx: [facility]  Januvia 25 mg oral tablet: 25 mg = 1 tab(s), Oral, Daily, # 30 tab(s), 5 Refill(s), Pharmacy: [facility] [id number] [place] [address]  100 mg oral capsule: 100 mg = 1 cap(s), Oral, BID, X 7 day(s), # 14 cap(s), 0 Refill(s), Pharmacy: [facility] [id number]  ONE TOUCH ULTRA BLUE TESTST(NEW)100: See Instructions, TEST THREE TIMES DAILY, # 100 unknown unit, 2 Refill(s), eRx: [facility] [address]  alPRAzolam 0.5 mg oral tablet: 0.5 mg = 1 tab(s), Oral, Daily, # 30 tab(s), 2 Refill(s), Pharmacy: [facility] [place] [id number]  apixaban 5 mg oral tablet: 5 mg = 1 tab(s), Oral, BID, # 60 tab(s), 5 Refill(s), Pharmacy: [facility] [place] [address]  glimepiride 4 mg oral tablet: See Instructions, TAKE 1 TABLET EVERY DAY, # 90 tab(s), 1 Refill(s), Pharmacy: [facility]  hydrALAZINE 25 mg oral tablet: 25 mg = 1 tab(s), Oral, BID, # 180 tab(s), 1 Refill(s), Pharmacy: [facility]  levothyroxine 75 mcg (0.075 mg) oral tablet: See Instructions, TAKE 1 TABLET EVERY DAY, # 90 tab(s), 1 Refill(s), Pharmacy: [facility]  lisinopril 40 mg oral tablet: 40 mg = 1 tab(s), Oral, Daily, # 90 tab(s), 1 Refill(s), Pharmacy: [facility]  metFORMIN 500 mg oral tablet, extended release: See Instructions, TAKE 2 TABLETS IN THE MORNING  AND TAKE 1 TABLET IN THE EVENING, # 270 tab(s), 2 Refill(s), eRx: [facility]  metoprolol succinate 100 mg oral tablet extended release: 100 mg = 1 tab(s), Oral, Daily, # 90 tab(s), 1 Refill(s), Pharmacy: [facility]  one touch ultra testing strips: one touch ultra testing strips, See Instructions, glucometer testing strips to be used TID, # 1 box(es), 3 Refill(s), Pharmacy: [facility] [address]  pravastatin 20 mg oral tablet: 20 mg = 1 tab(s), Oral, qPM, # 90 tab(s), 1 Refill(s), Pharmacy: [facility]  Documented Medications  Documented  BD SINGLE USE SWAB:   CHLORTHALIDONE 25 MG TABLET: 25 mg = 1 tab(s), Oral, Daily  CoQ10: 1 tab, Oral, BID, 0 Refill(s)  Fiber Laxative 500 mg oral tablet: 0 Refill(s)  Misc  Prescription: Cinnamon 1000mg bid, 0 Refill(s)  Misc Prescription: CoQ10 100mg, 1 po daily, 0 Refill(s),    Medications (1) Active  Scheduled: (1)  cefTRIAXone  2 gm 1 EA, IV Piggyback, q24hr  Continuous: (0)  PRN: (0)     Problem list:    All Problems  Atrial fibrillation, chronic / SNOMED CT [id number] / Confirmed  Benign positional vertigo / SNOMED CT 956354488 / Confirmed  Chronic kidney disease (CKD), stage III (moderate) / SNOMED CT [id number] / Confirmed  Depression / SNOMED CT 873952276 / Confirmed  Diabetes mellitus type 2 / SNOMED CT 676295809 / Confirmed  Dyslipidemia / SNOMED CT 1087688442 / Confirmed  HTN - Hypertension / SNOMED CT 8715280971 / Confirmed  Elevated LFTs / SNOMED CT 024498642 / Confirmed  Syncope and collapse / SNOMED CT 425070262 / Confirmed  Resolved: Angina pectoris / SNOMED CT 188516531  Resolved: Incontinence of urine / SNOMED CT 3002204345  Resolved: Malignant essential hypertension / SNOMED CT 076877942  Resolved: Polio / SNOMED CT 4347706655,    Active Problems (9)  Atrial fibrillation, chronic   Benign positional vertigo   Chronic kidney disease (CKD), stage III (moderate)   Depression   Diabetes mellitus type 2   Dyslipidemia   Elevated LFTs   HTN - Hypertension   Syncope and collapse         Histories   Past Medical History:    Resolved  Incontinence of urine (1168327771):  Resolved.  Malignant essential hypertension (911516886):  Resolved.  Angina pectoris (829421485):  Resolved.  Polio (9534879843):  Resolved.   Family History:    Diabetes mellitus type 2  Father ()  Mother ()  CAD - Coronary artery disease  Father ()  Cardiac arrhythmia.  Father ()     Procedure history:    knee.  Comments:  2015 16:37 - Contributor_system, [email or web address]  2014 12:47:46 - Morgan, Evelin: surgery to right knee in  and surgery to left knee in   Total abdominal hysterectomy (SNOMED CT  811008284).  Comments:  2/12/2015 16:37 - Contributor_system, [email or web address]  04/22/2014 12:48:29 - Maya Morat: at age 32  Cataract surgery (SNOMED CT 9205058173).  Comments:  2/12/2015 16:37 - Contributor_system, [email or web address]  04/22/2014 12:50:09 - Maya Morat: left eye  Cholecystectomy (SNOMED CT 62753246).  Heel spur (SNOMED CT [id number]).  Comments:  2/12/2015 16:37 - Contributor_system, [email or web address]  04/22/2014 12:51:33 - Maya Morat: bilateral repair  bladder suspension surgery.  Tonsillectomy and adenoidectomy; younger than age 14 (CPT4 77340).   Social History        Social & Psychosocial Habits    Alcohol  12/11/2015 Risk Assessment: Low Risk    12/11/2015  Use: Current    Type: Wine    Frequency: 1-2 times per month    Employment/School  12/14/2015  Status: Retired    Exercise  12/14/2015  Duration (average number of minutes): 30    Times per week: Daily    Self assessment: Fair condition    Exercise type: Walking    Home/Environment  12/14/2015  Lives with: Spouse    Living situation: Home/Independent    Nutrition/Health  12/14/2015  Type of diet: Regular    Sexual  12/14/2015  Sexually active: Yes    Substance Abuse  12/11/2015 Risk Assessment: Denies Substance Abuse    12/11/2015  Use: Never    Tobacco  12/11/2015 Risk Assessment: No Risk    12/11/2015  Use: Former smoker    Comment: Smoked from  [id number] - 12/11/2015 10:31 - Sweta Wang LPN        Physical Examination      Vital Signs (last 24 hrs)_____  Last Charted___________  Temp Oral     37.5 DegC  (MAR 15 13:15)  Heart Rate Peripheral   76 bpm  (MAR 15 14:15)  Resp Rate         18 br/min  (MAR 15 13:15)  SBP      108 mmHg  (MAR 15 15:00)  DBP      L 58mmHg  (MAR 15 15:00)  SpO2      97 %  (MAR 15 15:00)     General:  Alert and oriented, No acute distress.    Eye:  Extraocular movements are intact, Normal conjunctiva.    HENT:  Oral mucosa is moist.    Neck:  No jugular venous distention, No  lymphadenopathy.    Respiratory:  Lungs are clear to auscultation, Symmetrical chest wall expansion.    Cardiovascular:  Normal rate, Regular rhythm, No edema.    Gastrointestinal:  Soft, Non-tender, Non-distended, Normal bowel sounds.    Musculoskeletal:  Normal range of motion.    Integumentary:  Dry, Intact.    Neurologic:  Alert.         Orientation: Oriented X 4.    Psychiatric:  Appropriate mood & affect, Normal judgment.       Review / Management   Laboratory Results   Today's Lab Results : PowerNote Discrete Results   3/15/2018 13:21 CDT      WBC                       13.0 x10(3)/mcL  HI                             RBC                       4.14 x10(6)/mcL  LOW                             Hgb                       13.1 gm/dL                             Hct                       37.4 %                             Platelet                  174 x10(3)/mcL                             MCV                       90.3 fL                             MCH                       31.6 pg  HI                             MCHC                      35.0 gm/dL                             RDW                       12.8 %                             MPV                       10.3 fL                             Abs Neut                  12.12 x10(3)/mcL  HI                             Neutro Auto               93.0 %  HI                             Lymph Auto                1.2 %  LOW                             Mono Auto                 4.0 %                             Eos Auto                  1.0 %                             Abs Eos                   0.13 x10(3)/mcL                             Basophil Auto             0.1 %                             Abs Neutro                12.12 x10(3)/mcL  HI                             Abs Lymph                 0.16 x10(3)/mcL  LOW                             Abs Mono                  0.52 x10(3)/mcL                             Abs Baso                  0.01 x10(3)/mcL                              NRBC%                     0.0 %                             IG%                       0.700 %  HI                             IG#                       0.0900  HI                             Sodium Lvl                138 mmol/L                             Potassium Lvl             3.6 mmol/L                             Chloride                  103 mmol/L                             CO2                       25.0 mmol/L                             Calcium Lvl               8.8 mg/dL                             Glucose Lvl               219 mg/dL  HI                             BUN                       23.0 mg/dL  HI                             Creatinine                1.25 mg/dL  HI                             eGFR-AA                   53 mL/min/1.73 m2  NA                             eGFR-BRENDON                  44 mL/min/1.73 m2  NA                             Bili Total                0.5 mg/dL                             Bili Direct               0.10 mg/dL                             Bili Indirect             0.40 mg/dL                             AST                       56 unit/L  HI                             ALT                       42 unit/L                             Alk Phos                  65 unit/L                             Total Protein             7.0 gm/dL                             Albumin Lvl               3.6 gm/dL                             Globulin                  3 gm/dL                             A/G Ratio                 1  NA        Radiology results   Rad Results (ST)          Impression and Plan     Nausea and vomiting  Myalgias  Hypertension  DM 2 stable  Chronic Afib  Dehydration    Plan  start empiric abx  send blood cultures  check cxr  resume home meds  consult therapy in am       Patient care time greater than 30 minutes

## 2022-04-30 NOTE — DISCHARGE SUMMARY
DISCHARGE DATE:  03/20/2018    DISCHARGE DIAGNOSES:    1. Pancreatitis.   2. Nausea and vomiting resolved.    3. Constipation resolved.   4. Acute UTI with treatment complete.   5. Myalgias resolved.    6. Hypertension.   7. Diabetes mellitus type-2 stable with A1C of 6.9.   8. Chronic atrial fibrillation.  9. Elevated LFTs improved.    HOSPITAL COURSE:  The patient presented with weakness and nausea.  She was recently started on treatment of UTI by her PCP, then developed weakness, nausea and vomiting, and came to the hospital.  She also is having diarrhea and left flank pain.  Initially, the abdominal pain improved, but then it persisted so CT of her abdomen and pelvis was done and it showed inflammatory changes around her pancreas that represented acute pancreatitis.  Despite her imaging findings when her lipase was checked, it was within normal limits.  She also had mild elevation of LFTs after arrival that improved and trended downward while hospitalized.  Her appetite slowly improved.  She was tolerating a liquid diet by the time of discharge.  She did have some issues with constipation and that was treated and resolved before she left.  She was discharged home at her request on the evening of March 20th.  She tolerated diet and had no acute issues.  Strength had improved.  She was no longer vomiting and moving her bowels.  The plan is to have her follow-up with her PCP, Dr. Guzman, in clinic.    DISCHARGE TIME:  Greater than 30 minutes.        ______________________________  MD LAMONT Olvera/SOLEDAD  DD:  03/29/2018  Time:  02:06PM  DT:  04/02/2018  Time:  11:15AM  Job #:  [id number]

## 2022-04-30 NOTE — ED PROVIDER NOTES
Patient:   Carlito Winslow            MRN: [id number]            FIN: [id number]               Age:   82 years     Sex:  Female     :  1935   Associated Diagnoses:   Acute UTI   Author:   Christian Gardner MD      Basic Information   Time seen: Date & time 3/15/2018 13:35:00, Immediately upon arrival.   History source: Patient, EMS.   Arrival mode: Ambulance.   History limitation: None.   Additional information: Chief Complaint from Nursing Triage Note : Chief Complaint   3/15/2018 13:15 CDT      Chief Complaint           c/o shaking, recently dx with UTI by PCP yesterday  .      History of Present Illness   The patient presents with weakness.  The onset was 1  days ago.  The course/duration of symptoms is constant.  The character of symptoms is generalized.  The degree at present is moderate.  Risk factors consist of hypertension, diabetes mellitus and age.  Prior episodes: none.  Therapy today: prescription medications including Macrobid.  Associated symptoms: nausea, vomiting, chills and Body aches.  Additional history: Patient was diagnosed with a UTI yesterday by her PCP, prescribed Macrobid, has taken 3 doses.        Review of Systems   Constitutional symptoms:  Chills, Body aches, No fever,    Skin symptoms:  No rash,    Eye symptoms:  Vision unchanged.   ENMT symptoms:  No ear pain, no sore throat, no nasal congestion.    Respiratory symptoms:  No shortness of breath, no cough, no wheezing.    Cardiovascular symptoms:  No chest pain, no palpitations, no peripheral edema.    Gastrointestinal symptoms:  Nausea, vomiting, no abdominal pain, no diarrhea, no constipation.    Genitourinary symptoms:  Dysuria, no hematuria, no vaginal bleeding, no vaginal discharge.    Musculoskeletal symptoms:  No back pain,    Neurologic symptoms:  Weakness, no headache, no dizziness, no numbness, no tingling.              Additional review of systems information: All systems reviewed as documented in chart.       Health Status   Allergies:    Allergic Reactions (Selected)  Severity Not Documented  Cipro- Hives.  Demerol HCl- Headache.  FentaNYL- Cant breathe.  Hydrochlorothiazide- Unknown.  Penicillins- Rash/hllucinations.,    Allergies (5) Active Reaction  Cipro hives  Demerol HCl headache  fentaNYL cant breathe  hydrochlorothiazide unknown  penicillins rash/hllucinations  .   Medications:    Medications reviewed..      Past Medical/ Family/ Social History   Medical history:    Resolved  Incontinence of urine (3377319202):  Resolved.  Malignant essential hypertension (628848513):  Resolved.  Angina pectoris (089865530):  Resolved.  Polio (3930651286):  Resolved., Reviewed as documented in chart.   Surgical history:    knee.  Comments:  2015 16:37 - Contributor_system, [email or web address]  2014 12:47:46 - Evelin Mora: surgery to right knee in  and surgery to left knee in   Total abdominal hysterectomy (478412573).  Comments:  2015 16:37 - Contributor_system, [email or web address]  2014 12:48:29 - Evelin Mora: at age 32  Cataract surgery (0944888775).  Comments:  2015 16:37 - Contributor_system, [email or web address]  2014 12:50:09 - Evelin Mora: left eye  Cholecystectomy (58590827).  Heel spur ([id number]).  Comments:  2015 16:37 - Contributor_system, [email or web address]  2014 12:51:33 - Evelin Mora: bilateral repair  bladder suspension surgery.  Tonsillectomy and adenoidectomy; younger than age 14 (80590)., Reviewed as documented in chart.   Family history:    Diabetes mellitus type 2  Father ()  Mother ()  CAD - Coronary artery disease  Father ()  Cardiac arrhythmia.  Father ()  , Reviewed as documented in chart.   Social history:    Social & Psychosocial Habits    Alcohol  2015 Risk Assessment: Low Risk    2015  Use: Current    Type: Wine    Frequency: 1-2 times per month    Employment/School  2015   Status: Retired    Exercise  12/14/2015  Duration (average number of minutes): 30    Times per week: Daily    Self assessment: Fair condition    Exercise type: Walking    Home/Environment  12/14/2015  Lives with: Spouse    Living situation: Home/Independent    Nutrition/Health  12/14/2015  Type of diet: Regular    Sexual  12/14/2015  Sexually active: Yes    Substance Abuse  12/11/2015 Risk Assessment: Denies Substance Abuse    12/11/2015  Use: Never    Tobacco  12/11/2015 Risk Assessment: No Risk    12/11/2015  Use: Former smoker    Comment: Smoked from  [id number] - 12/11/2015 10:31 - Sweta Wang LPN  , Reviewed as documented in chart, Alcohol use: Regularly, Tobacco use: Denies, Drug use: Denies.   Problem list:    Active Problems (9)  Atrial fibrillation, chronic   Benign positional vertigo   Chronic kidney disease (CKD), stage III (moderate)   Depression   Diabetes mellitus type 2   Dyslipidemia   Elevated LFTs   HTN - Hypertension   Syncope and collapse   .      Physical Examination               Vital Signs   Vital Signs   3/15/2018 13:15 CDT      Temperature Oral          37.5 DegC                             Temperature Oral (calculated)             99.50 DegF                             Peripheral Pulse Rate     87 bpm                             Respiratory Rate          18 br/min                             SpO2                      97 %                             Oxygen Therapy            Room air                             Systolic Blood Pressure   145 mmHg  HI                             Diastolic Blood Pressure  66 mmHg  .      Vital Signs (last 24 hrs)_____  Last Charted___________  Temp Oral     37.5 DegC  (MAR 15 13:15)  Heart Rate Peripheral   87 bpm  (MAR 15 13:15)  Resp Rate         18 br/min  (MAR 15 13:15)  SBP      H 145mmHg  (MAR 15 13:15)  DBP      66 mmHg  (MAR 15 13:15)  SpO2      97 %  (MAR 15 13:15)  .   Measurements   3/15/2018 13:15 CDT      Weight Dosing             73 kg                              Weight Measured and Calculated in Lbs     160.94 lb                             Weight Estimated          73 kg                             Height/Length Dosing      154 cm                             Height/Length Estimated   154 cm                             Body Mass Index Estimated 30.78 kg/m2  .   Basic Oxygen Information   3/15/2018 13:15 CDT      SpO2                      97 %                             Oxygen Therapy            Room air  .   General:  Alert, no acute distress.    Skin:  Warm, pink, intact, no pallor, no rash, normal for ethnicity.    Head:  Normocephalic, atraumatic.    Eye:  Normal conjunctiva.   Ears, nose, mouth and throat:  Oral mucosa moist.   Cardiovascular:  Regular rate and rhythm, No murmur, Normal peripheral perfusion, No edema.    Respiratory:  Lungs are clear to auscultation, respirations are non-labored, breath sounds are equal, Symmetrical chest wall expansion.    Gastrointestinal:  Soft, Nontender, Non distended, Normal bowel sounds, No organomegaly.    Neurological:  Alert and oriented to person, place, time, and situation, No focal neurological deficit observed.    Psychiatric:  Cooperative.      Medical Decision Making   Documents reviewed:  Emergency department nurses' notes, emergency department records.    Results review:  Lab results : Lab View   3/15/2018 13:21 CDT      Sodium Lvl                138 mmol/L                             Potassium Lvl             3.6 mmol/L                             Chloride                  103 mmol/L                             CO2                       25.0 mmol/L                             Calcium Lvl               8.8 mg/dL                             Glucose Lvl               219 mg/dL  HI                             BUN                       23.0 mg/dL  HI                             Creatinine                1.25 mg/dL  HI                             eGFR-AA                   53 mL/min/1.73 m2  NA                              eGFR-BRENDON                  44 mL/min/1.73 m2  NA                             Bili Total                0.5 mg/dL                             Bili Direct               0.10 mg/dL                             Bili Indirect             0.40 mg/dL                             AST                       56 unit/L  HI                             ALT                       42 unit/L                             Alk Phos                  65 unit/L                             Total Protein             7.0 gm/dL                             Albumin Lvl               3.6 gm/dL                             Globulin                  3 gm/dL                             A/G Ratio                 1  NA                             WBC                       13.0 x10(3)/mcL  HI                             RBC                       4.14 x10(6)/mcL  LOW                             Hgb                       13.1 gm/dL                             Hct                       37.4 %                             Platelet                  174 x10(3)/mcL                             MCV                       90.3 fL                             MCH                       31.6 pg  HI                             MCHC                      35.0 gm/dL                             RDW                       12.8 %                             MPV                       10.3 fL                             Abs Neut                  12.12 x10(3)/mcL  HI                             Neutro Auto               93.0 %  HI                             Lymph Auto                1.2 %  LOW                             Mono Auto                 4.0 %                             Eos Auto                  1.0 %                             Abs Eos                   0.13 x10(3)/mcL                             Basophil Auto             0.1 %                             Abs Neutro                12.12 x10(3)/mcL  HI                             Abs Lymph                  0.16 x10(3)/mcL  LOW                             Abs Mono                  0.52 x10(3)/mcL                             Abs Baso                  0.01 x10(3)/mcL                             IG%                       0.700 %  HI                             IG#                       0.0900  HI  .      Impression and Plan   Diagnosis   Acute UTI ([id number] N39.0)      Calls-Consults   -  3/15/2018 14:26:00 , King BETI, Elver CAMERON, Hospitalist, phone call, consult, recommends Admit, IV antibiotics, IV Fluids.    Plan   Condition: Stable.    Disposition: Admit time  3/15/2018 14:26:00, Admit to Inpatient Unit.    Counseled: Patient, Regarding diagnosis, Regarding diagnostic results, Regarding treatment plan, Regarding prescription, Patient indicated understanding of instructions.

## 2022-06-13 RX ORDER — CHLORTHALIDONE 25 MG/1
25 TABLET ORAL DAILY
COMMUNITY
End: 2022-06-13 | Stop reason: SDUPTHER

## 2022-06-13 RX ORDER — CHLORTHALIDONE 25 MG/1
25 TABLET ORAL DAILY
Qty: 30 TABLET | Refills: 3 | Status: SHIPPED | OUTPATIENT
Start: 2022-06-13 | End: 2023-02-27

## 2022-06-13 NOTE — TELEPHONE ENCOUNTER
Patient called requesting refill of chlorthalidone. This was not listed in epic chart, but I did see it in cerner. I proposed a script.

## 2022-09-16 ENCOUNTER — HISTORICAL (OUTPATIENT)
Dept: ADMINISTRATIVE | Facility: HOSPITAL | Age: 87
End: 2022-09-16
Payer: MEDICARE

## 2022-09-19 RX ORDER — LEVOTHYROXINE SODIUM 75 UG/1
1 TABLET ORAL DAILY
COMMUNITY
Start: 2021-05-25 | End: 2022-09-19 | Stop reason: SDUPTHER

## 2022-09-19 RX ORDER — LEVOTHYROXINE SODIUM 75 UG/1
75 TABLET ORAL DAILY
Qty: 90 TABLET | Refills: 0 | Status: SHIPPED | OUTPATIENT
Start: 2022-09-19 | End: 2022-09-20 | Stop reason: SDUPTHER

## 2022-09-21 ENCOUNTER — HISTORICAL (OUTPATIENT)
Dept: ADMINISTRATIVE | Facility: HOSPITAL | Age: 87
End: 2022-09-21
Payer: MEDICARE

## 2022-09-21 RX ORDER — LEVOTHYROXINE SODIUM 75 UG/1
75 TABLET ORAL DAILY
Qty: 90 TABLET | Refills: 0 | Status: SHIPPED | OUTPATIENT
Start: 2022-09-21 | End: 2023-02-08

## 2023-02-08 RX ORDER — LEVOTHYROXINE SODIUM 75 UG/1
TABLET ORAL
Qty: 90 TABLET | Refills: 0 | Status: SHIPPED | OUTPATIENT
Start: 2023-02-08 | End: 2023-07-05

## 2023-02-08 NOTE — TELEPHONE ENCOUNTER
Patient does not have any visits scheduled. It looks like last appt was 3/9/2022.  Please have her schedule her annual wellness visit for further refills    I have signed for the following orders AND/OR meds.  Please call the patient and ask the patient to schedule the testing AND/OR inform about any medications that were sent.      Medications Ordered This Encounter   Medications    levothyroxine (SYNTHROID) 75 MCG tablet     Sig: TAKE 1 TABLET ONE TIME DAILY     Dispense:  90 tablet     Refill:  0

## 2023-07-05 RX ORDER — LEVOTHYROXINE SODIUM 75 UG/1
TABLET ORAL
Qty: 90 TABLET | Refills: 0 | Status: SHIPPED | OUTPATIENT
Start: 2023-07-05

## 2023-07-05 NOTE — TELEPHONE ENCOUNTER
Last ov: 3- (no upcoming appointment)  Lat filled: 2-  Please see attached refill request. Thanks

## 2023-07-05 NOTE — TELEPHONE ENCOUNTER
No appt scheduled in computer. Lov 3/9/22. Please schedule pt an appt before further refills will be given.

## 2023-07-06 DIAGNOSIS — E11.9 TYPE 2 DIABETES MELLITUS WITHOUT COMPLICATION, WITHOUT LONG-TERM CURRENT USE OF INSULIN: Primary | ICD-10-CM

## 2023-07-06 RX ORDER — GLIMEPIRIDE 4 MG/1
TABLET ORAL
Qty: 180 TABLET | Refills: 3 | Status: SHIPPED | OUTPATIENT
Start: 2023-07-06